# Patient Record
Sex: FEMALE | Race: WHITE | NOT HISPANIC OR LATINO | Employment: FULL TIME | URBAN - METROPOLITAN AREA
[De-identification: names, ages, dates, MRNs, and addresses within clinical notes are randomized per-mention and may not be internally consistent; named-entity substitution may affect disease eponyms.]

---

## 2023-10-23 ENCOUNTER — OFFICE VISIT (OUTPATIENT)
Dept: PRIMARY CARE | Facility: CLINIC | Age: 53
End: 2023-10-23
Payer: COMMERCIAL

## 2023-10-23 VITALS
HEIGHT: 63 IN | HEART RATE: 83 BPM | WEIGHT: 227 LBS | TEMPERATURE: 97.3 F | OXYGEN SATURATION: 98 % | DIASTOLIC BLOOD PRESSURE: 78 MMHG | BODY MASS INDEX: 40.22 KG/M2 | SYSTOLIC BLOOD PRESSURE: 124 MMHG

## 2023-10-23 DIAGNOSIS — M25.512 ACUTE PAIN OF LEFT SHOULDER: ICD-10-CM

## 2023-10-23 DIAGNOSIS — M54.2 NECK PAIN: Primary | ICD-10-CM

## 2023-10-23 DIAGNOSIS — R22.1 NODULE OF NECK: ICD-10-CM

## 2023-10-23 PROBLEM — E78.5 DYSLIPIDEMIA: Status: ACTIVE | Noted: 2023-10-23

## 2023-10-23 PROBLEM — R79.89 ABNORMAL LFTS: Status: ACTIVE | Noted: 2023-10-23

## 2023-10-23 PROCEDURE — 99213 OFFICE O/P EST LOW 20 MIN: CPT | Performed by: INTERNAL MEDICINE

## 2023-10-23 PROCEDURE — 1036F TOBACCO NON-USER: CPT | Performed by: INTERNAL MEDICINE

## 2023-10-23 RX ORDER — TIRZEPATIDE 10 MG/.5ML
INJECTION, SOLUTION SUBCUTANEOUS
COMMUNITY
Start: 2023-10-08

## 2023-10-23 RX ORDER — PHENTERMINE HYDROCHLORIDE 37.5 MG/1
37.5 TABLET ORAL
COMMUNITY
Start: 2023-07-21 | End: 2023-11-22 | Stop reason: ALTCHOICE

## 2023-10-23 RX ORDER — TIRZEPATIDE 2.5 MG/.5ML
INJECTION, SOLUTION SUBCUTANEOUS
COMMUNITY
Start: 2023-02-26

## 2023-10-23 ASSESSMENT — PATIENT HEALTH QUESTIONNAIRE - PHQ9
1. LITTLE INTEREST OR PLEASURE IN DOING THINGS: SEVERAL DAYS
2. FEELING DOWN, DEPRESSED OR HOPELESS: SEVERAL DAYS
SUM OF ALL RESPONSES TO PHQ9 QUESTIONS 1 AND 2: 2

## 2023-10-23 NOTE — PROGRESS NOTES
Subjective   Patient ID: Alysha Lou is a 52 y.o. female who presents for ER Follow-up (EP.  ER ([place]) follow up for heart attack symptoms but everything was normal.  R side of neck has muscular lump.).  HPI  Patient presents today for an ER follow-up.  She was in the ER 13 days ago on 10/10.  She was there due to pain in her left arm left neck and left shoulder and left upper chest.  She had a complete cardiac work-up.  She was felt to be noncardiac in nature.  All of test came back okay.  Patient states that that afternoon before she had went to the ER she had been chaperoning a field trip for school.  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  She states that she had been turned sideways and holding onto the bus seat while she was doing some counting.  The next day after the ER she realized that the motion that bothers her shoulder was the exact same motion of her head turned in her body turn holding onto the seat on the bus while counting students.  Patient states the symptoms have subsequently resolved.  She has no shortness of breath no breathing issues.  She is currently on Medrol and phentermine for weight loss.  She goes to a weight loss clinic for this.    Patient has no nausea vomiting diarrhea.  We reviewed all of her ER records they all were fine.  She was on that day seen by the school nurse as well as an urgent care that sent her to the ER.  Ultimately ER doctor released her for follow-up he told her it was noncardiac that she need to see her doctor.    Patient also complains of nodularity in her right neck that she can feel in [place] and feels round to linear in nature.  Patient has lost a considerable amount of weight.  She feels this tendon runs up and down her neck.  She does want me to check it.    Review of Systems  Review of systems was performed and is otherwise negative except as  "noted in HPI.  Objective   /78   Pulse 83   Temp 36.3 °C (97.3 °F) (Oral)   Ht 1.6 m (5' 3\")   Wt 103 kg (227 lb)   SpO2 98%   BMI 40.21 kg/m²    Physical Exam  HEENT is normal  Neck is supple she has a nonconcerning anterior reactive lymph node she has some palpable tendons in her neck that she states is the area of her concern  Lungs are clear bilaterally  Heart is regular rate and rhythm no murmurs  Abdomen is benign  Shoulders normal bilaterally with full range of motion no pain on palpation  Assessment/Plan   Diagnoses and all orders for this visit:  Neck pain  Acute pain of left shoulder  Nodule of neck    Patient will follow the nodularity of the neck  For shoulder and neck pain on the other side of her resolved on the left.  She will call if any symptoms recur  She will keep her routine follow-up appointment with me  Leela Landry MD   "

## 2023-11-14 ENCOUNTER — ANCILLARY PROCEDURE (OUTPATIENT)
Dept: RADIOLOGY | Facility: CLINIC | Age: 53
End: 2023-11-14
Payer: COMMERCIAL

## 2023-11-14 DIAGNOSIS — Z12.31 SCREENING MAMMOGRAM FOR BREAST CANCER: ICD-10-CM

## 2023-11-14 PROCEDURE — 77063 BREAST TOMOSYNTHESIS BI: CPT | Performed by: RADIOLOGY

## 2023-11-14 PROCEDURE — 77067 SCR MAMMO BI INCL CAD: CPT

## 2023-11-14 PROCEDURE — 77067 SCR MAMMO BI INCL CAD: CPT | Performed by: RADIOLOGY

## 2023-11-22 ENCOUNTER — OFFICE VISIT (OUTPATIENT)
Dept: PRIMARY CARE | Facility: CLINIC | Age: 53
End: 2023-11-22
Payer: COMMERCIAL

## 2023-11-22 VITALS
HEIGHT: 63 IN | DIASTOLIC BLOOD PRESSURE: 78 MMHG | WEIGHT: 230 LBS | SYSTOLIC BLOOD PRESSURE: 124 MMHG | TEMPERATURE: 98.2 F | HEART RATE: 71 BPM | OXYGEN SATURATION: 97 % | BODY MASS INDEX: 40.75 KG/M2

## 2023-11-22 DIAGNOSIS — F41.9 ANXIETY: Primary | ICD-10-CM

## 2023-11-22 PROCEDURE — 99214 OFFICE O/P EST MOD 30 MIN: CPT | Performed by: INTERNAL MEDICINE

## 2023-11-22 PROCEDURE — 1036F TOBACCO NON-USER: CPT | Performed by: INTERNAL MEDICINE

## 2023-11-22 RX ORDER — ESCITALOPRAM OXALATE 5 MG/1
5 TABLET ORAL DAILY
Qty: 30 TABLET | Refills: 1 | Status: SHIPPED | OUTPATIENT
Start: 2023-11-22 | End: 2023-12-28 | Stop reason: SDUPTHER

## 2023-11-22 RX ORDER — ALPRAZOLAM 0.5 MG/1
0.5 TABLET ORAL DAILY PRN
Qty: 7 TABLET | Refills: 0 | Status: SHIPPED | OUTPATIENT
Start: 2023-11-22 | End: 2024-05-10 | Stop reason: SDUPTHER

## 2023-11-22 ASSESSMENT — PATIENT HEALTH QUESTIONNAIRE - PHQ9
1. LITTLE INTEREST OR PLEASURE IN DOING THINGS: NOT AT ALL
2. FEELING DOWN, DEPRESSED OR HOPELESS: NOT AT ALL
SUM OF ALL RESPONSES TO PHQ9 QUESTIONS 1 AND 2: 0
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
## 2023-12-12 DIAGNOSIS — E78.5 DYSLIPIDEMIA: ICD-10-CM

## 2023-12-12 DIAGNOSIS — R79.89 ABNORMAL LFTS: ICD-10-CM

## 2023-12-12 PROBLEM — R53.83 FATIGUE: Status: ACTIVE | Noted: 2023-12-12

## 2023-12-28 ENCOUNTER — OFFICE VISIT (OUTPATIENT)
Dept: PRIMARY CARE | Facility: CLINIC | Age: 53
End: 2023-12-28
Payer: COMMERCIAL

## 2023-12-28 ENCOUNTER — LAB (OUTPATIENT)
Dept: LAB | Facility: LAB | Age: 53
End: 2023-12-28
Payer: COMMERCIAL

## 2023-12-28 VITALS
DIASTOLIC BLOOD PRESSURE: 76 MMHG | HEIGHT: 63 IN | WEIGHT: 225 LBS | BODY MASS INDEX: 39.87 KG/M2 | OXYGEN SATURATION: 95 % | HEART RATE: 88 BPM | SYSTOLIC BLOOD PRESSURE: 122 MMHG | TEMPERATURE: 97.6 F

## 2023-12-28 DIAGNOSIS — E78.5 DYSLIPIDEMIA: ICD-10-CM

## 2023-12-28 DIAGNOSIS — E78.5 DYSLIPIDEMIA: Primary | ICD-10-CM

## 2023-12-28 DIAGNOSIS — R79.89 ABNORMAL LFTS: ICD-10-CM

## 2023-12-28 DIAGNOSIS — F41.9 ANXIETY: ICD-10-CM

## 2023-12-28 LAB
ALBUMIN SERPL BCP-MCNC: 4.2 G/DL (ref 3.4–5)
ALP SERPL-CCNC: 96 U/L (ref 33–110)
ALT SERPL W P-5'-P-CCNC: 22 U/L (ref 7–45)
ANION GAP SERPL CALC-SCNC: 9 MMOL/L (ref 10–20)
AST SERPL W P-5'-P-CCNC: 20 U/L (ref 9–39)
BILIRUB SERPL-MCNC: 1 MG/DL (ref 0–1.2)
BUN SERPL-MCNC: 17 MG/DL (ref 6–23)
CALCIUM SERPL-MCNC: 10.1 MG/DL (ref 8.6–10.6)
CHLORIDE SERPL-SCNC: 108 MMOL/L (ref 98–107)
CHOLEST SERPL-MCNC: 182 MG/DL (ref 0–199)
CHOLESTEROL/HDL RATIO: 3.3
CO2 SERPL-SCNC: 29 MMOL/L (ref 21–32)
CREAT SERPL-MCNC: 0.91 MG/DL (ref 0.5–1.05)
GFR SERPL CREATININE-BSD FRML MDRD: 75 ML/MIN/1.73M*2
GLUCOSE SERPL-MCNC: 90 MG/DL (ref 74–99)
HDLC SERPL-MCNC: 56 MG/DL
LDLC SERPL CALC-MCNC: 104 MG/DL
NON HDL CHOLESTEROL: 126 MG/DL (ref 0–149)
POTASSIUM SERPL-SCNC: 4.3 MMOL/L (ref 3.5–5.3)
PROT SERPL-MCNC: 7.2 G/DL (ref 6.4–8.2)
SODIUM SERPL-SCNC: 142 MMOL/L (ref 136–145)
TRIGL SERPL-MCNC: 112 MG/DL (ref 0–149)
VLDL: 22 MG/DL (ref 0–40)

## 2023-12-28 PROCEDURE — 80053 COMPREHEN METABOLIC PANEL: CPT

## 2023-12-28 PROCEDURE — 36415 COLL VENOUS BLD VENIPUNCTURE: CPT

## 2023-12-28 PROCEDURE — 99213 OFFICE O/P EST LOW 20 MIN: CPT | Performed by: INTERNAL MEDICINE

## 2023-12-28 PROCEDURE — 80061 LIPID PANEL: CPT

## 2023-12-28 PROCEDURE — 1036F TOBACCO NON-USER: CPT | Performed by: INTERNAL MEDICINE

## 2023-12-28 RX ORDER — ESCITALOPRAM OXALATE 5 MG/1
5 TABLET ORAL DAILY
Qty: 90 TABLET | Refills: 1 | Status: SHIPPED | OUTPATIENT
Start: 2023-12-28 | End: 2024-05-10 | Stop reason: DRUGHIGH

## 2023-12-28 ASSESSMENT — PATIENT HEALTH QUESTIONNAIRE - PHQ9
SUM OF ALL RESPONSES TO PHQ9 QUESTIONS 1 AND 2: 0
1. LITTLE INTEREST OR PLEASURE IN DOING THINGS: NOT AT ALL
2. FEELING DOWN, DEPRESSED OR HOPELESS: NOT AT ALL

## 2023-12-28 NOTE — PROGRESS NOTES
"Subjective   Patient ID: Alysha Lou is a 53 y.o. female who presents for Follow-up (EP.  Follow up on Anxiety.  Doing good.).  HPI  Patient presents today for follow-up of anxiety.  She has been on Lexapro 5 mg.  She states it has been working very well and she is pleased with its effects.  She will to continue it.  She states her sleep and her appetite are good her moods are good she is not having any untoward side effects.    Patient states she is also been doing Mounjaro through the independent [facility] and now that southbound is out she is looking at changing over to that.  She has been successful with the Mounjaro and she would like to continue the GLP-1 therapy.    Patient had her blood work done today it was not done at the time of the appointment    Review of Systems  Review of systems was performed and is otherwise negative except as noted in HPI.  Objective   /76   Pulse 88   Temp 36.4 °C (97.6 °F) (Oral)   Ht 1.6 m (5' 3\")   Wt 102 kg (225 lb)   SpO2 95%   BMI 39.86 kg/m²    Physical Exam  HEENT is normal  Lungs clear bilaterally  Heart is regular rate rhythm no murmurs  Abdomen benign  Lower extremities no edema    Assessment/Plan   Diagnoses and all orders for this visit:  Dyslipidemia  Anxiety  -     escitalopram (Lexapro) 5 mg tablet; Take 1 tablet (5 mg) by mouth once daily.    Call with issues  Check blood work  Follow-up with me in 6 months  At that time she will be due for physical she will continue to work on diet and exercise  Leela Landry MD   "

## 2024-05-10 ENCOUNTER — OFFICE VISIT (OUTPATIENT)
Dept: PRIMARY CARE | Facility: CLINIC | Age: 54
End: 2024-05-10
Payer: COMMERCIAL

## 2024-05-10 VITALS
OXYGEN SATURATION: 99 % | WEIGHT: 225 LBS | SYSTOLIC BLOOD PRESSURE: 122 MMHG | HEIGHT: 63 IN | HEART RATE: 72 BPM | TEMPERATURE: 98 F | DIASTOLIC BLOOD PRESSURE: 76 MMHG | BODY MASS INDEX: 39.87 KG/M2

## 2024-05-10 DIAGNOSIS — F41.9 ANXIETY: ICD-10-CM

## 2024-05-10 PROCEDURE — 99214 OFFICE O/P EST MOD 30 MIN: CPT | Performed by: INTERNAL MEDICINE

## 2024-05-10 PROCEDURE — 1036F TOBACCO NON-USER: CPT | Performed by: INTERNAL MEDICINE

## 2024-05-10 RX ORDER — ALPRAZOLAM 0.5 MG/1
0.5 TABLET ORAL DAILY PRN
Qty: 7 TABLET | Refills: 0 | Status: SHIPPED | OUTPATIENT
Start: 2024-05-10 | End: 2025-01-05

## 2024-05-10 RX ORDER — ESCITALOPRAM OXALATE 10 MG/1
10 TABLET ORAL DAILY
Qty: 90 TABLET | Refills: 0 | Status: SHIPPED | OUTPATIENT
Start: 2024-05-10 | End: 2024-11-06

## 2024-05-10 RX ORDER — ESCITALOPRAM OXALATE 5 MG/1
5 TABLET ORAL DAILY
Qty: 90 TABLET | Refills: 1 | Status: CANCELLED | OUTPATIENT
Start: 2024-05-10 | End: 2024-11-06

## 2024-05-10 ASSESSMENT — PATIENT HEALTH QUESTIONNAIRE - PHQ9
2. FEELING DOWN, DEPRESSED OR HOPELESS: NOT AT ALL
SUM OF ALL RESPONSES TO PHQ9 QUESTIONS 1 AND 2: 0
1. LITTLE INTEREST OR PLEASURE IN DOING THINGS: NOT AT ALL
SUM OF ALL RESPONSES TO PHQ9 QUESTIONS 1 AND 2: 0
1. LITTLE INTEREST OR PLEASURE IN DOING THINGS: NOT AT ALL
2. FEELING DOWN, DEPRESSED OR HOPELESS: NOT AT ALL

## 2024-05-10 ASSESSMENT — ENCOUNTER SYMPTOMS
LOSS OF SENSATION IN FEET: 0
DEPRESSION: 0
OCCASIONAL FEELINGS OF UNSTEADINESS: 0

## 2024-05-10 NOTE — LETTER
May 10, 2024     Patient: Alysha Lou   YOB: 1970   Date of Visit: 5/10/2024       To Whom It May Concern:    Alysha Lou was seen in my clinic on 5/10/2024 at 10:45 am. Please excuse Alysha for her absence from work on this day to make the appointment.  Please excuse 5/10/24 through 6/6/24 due to  medical condition.    If you have any questions or concerns, please don't hesitate to call.         Sincerely,         Leela Landry MD        CC: No Recipients
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
## 2024-05-14 ENCOUNTER — TELEPHONE (OUTPATIENT)
Dept: PRIMARY CARE | Facility: CLINIC | Age: 54
End: 2024-05-14
Payer: COMMERCIAL

## 2024-08-09 ENCOUNTER — APPOINTMENT (OUTPATIENT)
Dept: PRIMARY CARE | Facility: CLINIC | Age: 54
End: 2024-08-09
Payer: COMMERCIAL

## 2024-10-09 ENCOUNTER — TELEPHONE (OUTPATIENT)
Dept: PRIMARY CARE | Facility: CLINIC | Age: 54
End: 2024-10-09
Payer: COMMERCIAL

## 2024-10-09 DIAGNOSIS — R53.83 FATIGUE, UNSPECIFIED TYPE: ICD-10-CM

## 2024-10-09 DIAGNOSIS — Z12.31 VISIT FOR SCREENING MAMMOGRAM: ICD-10-CM

## 2024-10-09 DIAGNOSIS — E78.5 DYSLIPIDEMIA: ICD-10-CM

## 2024-10-09 DIAGNOSIS — R79.89 ABNORMAL LFTS: ICD-10-CM

## 2024-10-09 NOTE — TELEPHONE ENCOUNTER
Pt called needing to schedule a CPE.  She needs a Bio Metric Screening completed by 11/25/24.  She was scheduled for a follow up anxiety and will schedule her CPE at a later date.     She has an appt on 11/19/24 and needs b/w order prior to her visit.

## 2024-11-16 ENCOUNTER — LAB (OUTPATIENT)
Dept: LAB | Facility: LAB | Age: 54
End: 2024-11-16
Payer: COMMERCIAL

## 2024-11-16 DIAGNOSIS — R53.83 FATIGUE, UNSPECIFIED TYPE: ICD-10-CM

## 2024-11-16 DIAGNOSIS — E78.5 DYSLIPIDEMIA: ICD-10-CM

## 2024-11-16 DIAGNOSIS — R79.89 ABNORMAL LFTS: ICD-10-CM

## 2024-11-16 LAB
ALBUMIN SERPL BCP-MCNC: 4.4 G/DL (ref 3.4–5)
ALP SERPL-CCNC: 72 U/L (ref 33–110)
ALT SERPL W P-5'-P-CCNC: 18 U/L (ref 7–45)
ANION GAP SERPL CALC-SCNC: 10 MMOL/L (ref 10–20)
AST SERPL W P-5'-P-CCNC: 19 U/L (ref 9–39)
BASOPHILS # BLD AUTO: 0.05 X10*3/UL (ref 0–0.1)
BASOPHILS NFR BLD AUTO: 0.7 %
BILIRUB SERPL-MCNC: 1.3 MG/DL (ref 0–1.2)
BUN SERPL-MCNC: 26 MG/DL (ref 6–23)
CALCIUM SERPL-MCNC: 10 MG/DL (ref 8.6–10.6)
CHLORIDE SERPL-SCNC: 107 MMOL/L (ref 98–107)
CHOLEST SERPL-MCNC: 168 MG/DL (ref 0–199)
CHOLESTEROL/HDL RATIO: 3.6
CO2 SERPL-SCNC: 25 MMOL/L (ref 21–32)
CREAT SERPL-MCNC: 0.75 MG/DL (ref 0.5–1.05)
EGFRCR SERPLBLD CKD-EPI 2021: >90 ML/MIN/1.73M*2
EOSINOPHIL # BLD AUTO: 0.15 X10*3/UL (ref 0–0.7)
EOSINOPHIL NFR BLD AUTO: 2 %
ERYTHROCYTE [DISTWIDTH] IN BLOOD BY AUTOMATED COUNT: 12.8 % (ref 11.5–14.5)
GLUCOSE SERPL-MCNC: 87 MG/DL (ref 74–99)
HCT VFR BLD AUTO: 42.3 % (ref 36–46)
HDLC SERPL-MCNC: 47.1 MG/DL
HGB BLD-MCNC: 13.7 G/DL (ref 12–16)
IMM GRANULOCYTES # BLD AUTO: 0.02 X10*3/UL (ref 0–0.7)
IMM GRANULOCYTES NFR BLD AUTO: 0.3 % (ref 0–0.9)
LDLC SERPL CALC-MCNC: 103 MG/DL
LYMPHOCYTES # BLD AUTO: 2.41 X10*3/UL (ref 1.2–4.8)
LYMPHOCYTES NFR BLD AUTO: 32.9 %
MCH RBC QN AUTO: 28.7 PG (ref 26–34)
MCHC RBC AUTO-ENTMCNC: 32.4 G/DL (ref 32–36)
MCV RBC AUTO: 89 FL (ref 80–100)
MONOCYTES # BLD AUTO: 0.54 X10*3/UL (ref 0.1–1)
MONOCYTES NFR BLD AUTO: 7.4 %
NEUTROPHILS # BLD AUTO: 4.16 X10*3/UL (ref 1.2–7.7)
NEUTROPHILS NFR BLD AUTO: 56.7 %
NON HDL CHOLESTEROL: 121 MG/DL (ref 0–149)
NRBC BLD-RTO: 0 /100 WBCS (ref 0–0)
PLATELET # BLD AUTO: 273 X10*3/UL (ref 150–450)
POTASSIUM SERPL-SCNC: 4.3 MMOL/L (ref 3.5–5.3)
PROT SERPL-MCNC: 7.5 G/DL (ref 6.4–8.2)
RBC # BLD AUTO: 4.78 X10*6/UL (ref 4–5.2)
SODIUM SERPL-SCNC: 138 MMOL/L (ref 136–145)
TRIGL SERPL-MCNC: 89 MG/DL (ref 0–149)
TSH SERPL-ACNC: 1.01 MIU/L (ref 0.44–3.98)
VLDL: 18 MG/DL (ref 0–40)
WBC # BLD AUTO: 7.3 X10*3/UL (ref 4.4–11.3)

## 2024-11-16 PROCEDURE — 80053 COMPREHEN METABOLIC PANEL: CPT

## 2024-11-16 PROCEDURE — 84443 ASSAY THYROID STIM HORMONE: CPT

## 2024-11-16 PROCEDURE — 80061 LIPID PANEL: CPT

## 2024-11-16 PROCEDURE — 85025 COMPLETE CBC W/AUTO DIFF WBC: CPT

## 2024-11-16 PROCEDURE — 36415 COLL VENOUS BLD VENIPUNCTURE: CPT

## 2024-11-19 ENCOUNTER — APPOINTMENT (OUTPATIENT)
Dept: PRIMARY CARE | Facility: CLINIC | Age: 54
End: 2024-11-19
Payer: COMMERCIAL

## 2024-11-19 VITALS
HEIGHT: 63 IN | WEIGHT: 221 LBS | BODY MASS INDEX: 39.16 KG/M2 | OXYGEN SATURATION: 97 % | DIASTOLIC BLOOD PRESSURE: 72 MMHG | TEMPERATURE: 98.1 F | HEART RATE: 76 BPM | SYSTOLIC BLOOD PRESSURE: 118 MMHG

## 2024-11-19 DIAGNOSIS — Z00.00 ROUTINE GENERAL MEDICAL EXAMINATION AT A HEALTH CARE FACILITY: Primary | ICD-10-CM

## 2024-11-19 DIAGNOSIS — F41.9 ANXIETY: ICD-10-CM

## 2024-11-19 PROCEDURE — 99214 OFFICE O/P EST MOD 30 MIN: CPT | Performed by: INTERNAL MEDICINE

## 2024-11-19 PROCEDURE — 1036F TOBACCO NON-USER: CPT | Performed by: INTERNAL MEDICINE

## 2024-11-19 PROCEDURE — 3008F BODY MASS INDEX DOCD: CPT | Performed by: INTERNAL MEDICINE

## 2024-11-19 RX ORDER — ESCITALOPRAM OXALATE 10 MG/1
10 TABLET ORAL DAILY
Qty: 90 TABLET | Refills: 0 | Status: SHIPPED | OUTPATIENT
Start: 2024-11-19 | End: 2025-05-18

## 2024-11-19 RX ORDER — MEDROXYPROGESTERONE ACETATE 2.5 MG/1
2.5 TABLET ORAL DAILY
COMMUNITY
Start: 2024-10-29

## 2024-11-19 RX ORDER — ESTRADIOL 1 MG/1
1 TABLET ORAL ONCE
COMMUNITY
Start: 2024-10-02

## 2024-11-19 ASSESSMENT — ENCOUNTER SYMPTOMS
DEPRESSION: 0
LOSS OF SENSATION IN FEET: 0
OCCASIONAL FEELINGS OF UNSTEADINESS: 0

## 2024-11-19 NOTE — PROGRESS NOTES
"Subjective   Patient ID: Alysha Lou is a 53 y.o. female who presents for Follow-up (EP.  Follow up anxiety.  Labs done.  Concerned has ADHD.  Started hormones.).  HPI  Here for  follow  up    Has been in a weight management program and they dont want her to do a HIIT program because it raises cortisol    On  tirezepatide and plateaued and   has chged  dose and calories and still stuck  Her weight is plateaued over the last 4 to 5 months but she felt it was due to elevated cortisol with her previous job however she has changed jobs and she still having issues  Concerned about focus and thinks might have ADD and has executive function issues she would like to have this evaluated   New to hormones from gynecologist  has hot flashes and using for this vasomotor instability  She denies chest pains headaches dizziness lightheadedness or shortness of breath she has no lower extremity edema    Review of Systems  Review of systems was performed and is otherwise negative except as noted in HPI.      Objective   /72   Pulse 76   Temp 36.7 °C (98.1 °F) (Oral)   Ht 1.6 m (5' 3\")   Wt 100 kg (221 lb)   SpO2 97%   BMI 39.15 kg/m²      Physical Exam  HEENT is normal  Lungs clear bilaterally  Heart is regular rate rhythm no murmurs  Abdomen benign  Lower extremities no edema     Assessment/Plan   Diagnoses and all orders for this visit:  Routine general medical examination at a health care facility  -     CBC and Auto Differential; Future  -     Comprehensive Metabolic Panel; Future  -     Lipid Panel; Future  -     TSH with reflex to Free T4 if abnormal; Future  Anxiety  -     escitalopram (Lexapro) 10 mg tablet; Take 1 tablet (10 mg) by mouth once daily.    Concerns regarding ADD-will refer to psychology for evaluation  Follow-up with gynecology  She will continue to follow-up with weight management  Refill Lexapro but she may try 5 mg due to family stresses without appearance that are in [place]  Blood work " ordered for 6 months follow-up with me for physical    Leela Landry MD

## 2025-05-11 LAB
ALBUMIN SERPL-MCNC: 4.4 G/DL (ref 3.6–5.1)
ALP SERPL-CCNC: 69 U/L (ref 37–153)
ALT SERPL-CCNC: 15 U/L (ref 6–29)
ANION GAP SERPL CALCULATED.4IONS-SCNC: 4 MMOL/L (CALC) (ref 7–17)
AST SERPL-CCNC: 17 U/L (ref 10–35)
BASOPHILS # BLD AUTO: 30 CELLS/UL (ref 0–200)
BASOPHILS NFR BLD AUTO: 0.5 %
BILIRUB SERPL-MCNC: 1.1 MG/DL (ref 0.2–1.2)
BUN SERPL-MCNC: 21 MG/DL (ref 7–25)
CALCIUM SERPL-MCNC: 9.5 MG/DL (ref 8.6–10.4)
CHLORIDE SERPL-SCNC: 108 MMOL/L (ref 98–110)
CHOLEST SERPL-MCNC: 167 MG/DL
CHOLEST/HDLC SERPL: 3.3 (CALC)
CO2 SERPL-SCNC: 28 MMOL/L (ref 20–32)
CREAT SERPL-MCNC: 0.82 MG/DL (ref 0.5–1.03)
EGFRCR SERPLBLD CKD-EPI 2021: 84 ML/MIN/1.73M2
EOSINOPHIL # BLD AUTO: 120 CELLS/UL (ref 15–500)
EOSINOPHIL NFR BLD AUTO: 2 %
ERYTHROCYTE [DISTWIDTH] IN BLOOD BY AUTOMATED COUNT: 13.2 % (ref 11–15)
GLUCOSE SERPL-MCNC: 85 MG/DL (ref 65–99)
HCT VFR BLD AUTO: 42.5 % (ref 35–45)
HCV AB SERPL QL IA: NORMAL
HDLC SERPL-MCNC: 50 MG/DL
HGB BLD-MCNC: 13.9 G/DL (ref 11.7–15.5)
LDLC SERPL CALC-MCNC: 100 MG/DL (CALC)
LYMPHOCYTES # BLD AUTO: 2016 CELLS/UL (ref 850–3900)
LYMPHOCYTES NFR BLD AUTO: 33.6 %
MCH RBC QN AUTO: 30.2 PG (ref 27–33)
MCHC RBC AUTO-ENTMCNC: 32.7 G/DL (ref 32–36)
MCV RBC AUTO: 92.4 FL (ref 80–100)
MONOCYTES # BLD AUTO: 306 CELLS/UL (ref 200–950)
MONOCYTES NFR BLD AUTO: 5.1 %
NEUTROPHILS # BLD AUTO: 3528 CELLS/UL (ref 1500–7800)
NEUTROPHILS NFR BLD AUTO: 58.8 %
NONHDLC SERPL-MCNC: 117 MG/DL (CALC)
PLATELET # BLD AUTO: 236 THOUSAND/UL (ref 140–400)
PMV BLD REES-ECKER: 9.6 FL (ref 7.5–12.5)
POTASSIUM SERPL-SCNC: 4.5 MMOL/L (ref 3.5–5.3)
PROT SERPL-MCNC: 7.2 G/DL (ref 6.1–8.1)
RBC # BLD AUTO: 4.6 MILLION/UL (ref 3.8–5.1)
SODIUM SERPL-SCNC: 140 MMOL/L (ref 135–146)
TRIGL SERPL-MCNC: 77 MG/DL
TSH SERPL-ACNC: 1.08 MIU/L (ref 0.4–4.5)
WBC # BLD AUTO: 6 THOUSAND/UL (ref 3.8–10.8)

## 2025-05-12 ENCOUNTER — APPOINTMENT (OUTPATIENT)
Dept: PRIMARY CARE | Facility: CLINIC | Age: 55
End: 2025-05-12
Payer: COMMERCIAL

## 2025-05-12 VITALS
DIASTOLIC BLOOD PRESSURE: 76 MMHG | SYSTOLIC BLOOD PRESSURE: 114 MMHG | OXYGEN SATURATION: 98 % | TEMPERATURE: 98.1 F | HEIGHT: 63 IN | WEIGHT: 217 LBS | HEART RATE: 74 BPM | BODY MASS INDEX: 38.45 KG/M2

## 2025-05-12 DIAGNOSIS — E66.09 CLASS 2 OBESITY DUE TO EXCESS CALORIES WITHOUT SERIOUS COMORBIDITY WITH BODY MASS INDEX (BMI) OF 38.0 TO 38.9 IN ADULT: Primary | ICD-10-CM

## 2025-05-12 DIAGNOSIS — F41.9 ANXIETY: ICD-10-CM

## 2025-05-12 DIAGNOSIS — Z12.31 ENCOUNTER FOR SCREENING MAMMOGRAM FOR MALIGNANT NEOPLASM OF BREAST: ICD-10-CM

## 2025-05-12 DIAGNOSIS — E66.812 CLASS 2 OBESITY DUE TO EXCESS CALORIES WITHOUT SERIOUS COMORBIDITY WITH BODY MASS INDEX (BMI) OF 38.0 TO 38.9 IN ADULT: Primary | ICD-10-CM

## 2025-05-12 DIAGNOSIS — Z00.00 ROUTINE GENERAL MEDICAL EXAMINATION AT A HEALTH CARE FACILITY: ICD-10-CM

## 2025-05-12 PROCEDURE — 3008F BODY MASS INDEX DOCD: CPT | Performed by: INTERNAL MEDICINE

## 2025-05-12 PROCEDURE — 99396 PREV VISIT EST AGE 40-64: CPT | Performed by: INTERNAL MEDICINE

## 2025-05-12 PROCEDURE — 1036F TOBACCO NON-USER: CPT | Performed by: INTERNAL MEDICINE

## 2025-05-12 RX ORDER — ESCITALOPRAM OXALATE 10 MG/1
10 TABLET ORAL DAILY
Qty: 90 TABLET | Refills: 1 | Status: SHIPPED | OUTPATIENT
Start: 2025-05-12 | End: 2025-11-08

## 2025-05-12 RX ORDER — ALPRAZOLAM 0.5 MG/1
0.5 TABLET ORAL DAILY PRN
Qty: 7 TABLET | Refills: 0 | Status: CANCELLED | OUTPATIENT
Start: 2025-05-12 | End: 2026-01-07

## 2025-05-12 ASSESSMENT — ENCOUNTER SYMPTOMS
LOSS OF SENSATION IN FEET: 0
OCCASIONAL FEELINGS OF UNSTEADINESS: 0
DEPRESSION: 0

## 2025-05-12 NOTE — PROGRESS NOTES
Subjective   Patient ID: Alysha Lou is a 54 y.o. female who presents for Annual Exam (EP.  Annual exam.  Labs done.  R ear pain and fluid .  Migraines on R side of head.).    HPI  Patient is here for annual check-up      History of Present Illness  Alysha Lou is a 54 year old female who presents for a follow-up visit regarding her antidepressant and anxiety medication management.    She is currently taking Lexapro at a dose of 10 mg, which has been effective for her. She has been taking this dose consistently over the past week and wishes to continue at this level. She also occasionally uses alprazolam (Xanax) but does not require a refill at this time.    She is on hormone therapy prescribed by her gynecologist and continues to use Mounjaro, although she has had to switch to a compounded version due to insurance issues. The compounded version costs $329, which is higher than her previous cost of $199. She has experienced a weight loss of 10 pounds over the last four to five months but has not lost any weight in the past month.    She experiences headaches, which she describes as muscle strain and sometimes associates with fluid in her ear, leading to dizziness. She has not used nasal steroid sprays like Flonase. She notes that her symptoms may be related to weather changes or allergies.    She drinks one to two cups of caffeine daily, consumes beer, and does not smoke. 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 No swelling in the legs unless sitting for extended periods. Occasional dizziness, attributed to not eating properly.       Last well check 1 yr     Reported health good    Dental  check  reg     Vision check reg   Vision issues n  Hearing issues n  Vaccines UTD  y    Diet healthy  on semaglutide   Exercise  regular   Caffeine 1 c   Alcohol rare  Tobacco never     Colon cancer screening  2022    Sees DR Munroe for Gyn      Review of Systems  GENERAL - Denies  "fever, fatigue or chills  SKIN - Denies rash, new skin lesions, or change in moles  EYES - Denies blurred vision, or change in visual acuity  EARS -pressure in the right ear sometimes on the right side of the face has a history of a lazy eye on the right so no she gets eyestrain   NOSE - Denies nasal congestion, discharge, or bleeding  MOUTH - Denies sore throat, postnasal drip or painful/difficulty swallowing  NECK - Denies pain or swelling  RESPIRATORY - Denies shortness of breath, cough, wheezing  CARDIOVASCULAR - Denies palpitations, chest pain, orthopnea, peripheral edema, syncope or claudication  GASTROINTESTINAL - Denies nausea, vomiting, diarrhea, constipation, abdominal pain, melena and or bright red blood  GENITOURINARY - Denies dysuria, frequency of urination, urgency, or hesitancy  MUSCULOSKELETAL - Denies joint or muscle pain, or back pain  NEUROLOGICAL -headaches on the right side see HEENT   PSYCHIATRIC - Denies depression, anxiety, substance abuse, suicidal or homicidal ideation  ENDOCRINE - Denies heat or cold intolerance, weight loss or gain, increasing thirst  HEMATO-IMMUNOLOGIC - Denies easy bruising, bleeding, oral ulcerations or recurrent infections    Objective   /76   Pulse 74   Temp 36.7 °C (98.1 °F) (Oral)   Ht 1.6 m (5' 3\")   Wt 98.4 kg (217 lb)   SpO2 98%   BMI 38.44 kg/m²      Physical Exam  CONSTITUTIONAL - well nourished, well developed, looks like stated age, in no acute distress, not ill-appearing, and not tired appearing  SKIN - normal skin color and pigmentation, normal skin turgor without rash, lesions, or nodules visualized  HEAD - no trauma, normocephalic  EYES - pupils are equal and reactive to light, extraocular muscles are intact, and normal external exam  ENT - TM's intact, no injection, no signs of infection, uvula midline, normal tongue movement and throat normal, no exudate, nasal passage without discharge and patent  NECK - supple without rigidity, no neck " mass was observed, no thyromegaly or thyroid nodules  CHEST - clear to auscultation, no wheezing, no crackles and no rales, good effort  CARDIAC - regular rate and regular rhythm, no skipped beats, no murmur  ABDOMEN - no organomegaly, soft, nontender, nondistended, normal bowel sounds, no guarding/rebound/rigidity, negative McBurney sign and negative Gutierrez sign  EXTREMITIES - no edema, no deformities  NEUROLOGICAL - normal gait, normal balance, normal motor, no ataxia, DTRs equal and symmetrical; alert, oriented and no focal signs  PSYCHIATRIC - alert, pleasant and cordial, age-appropriate  IMMUNOLOGIC - no cervical lymphadenopathy    Assessment/Plan   Diagnoses and all orders for this visit:  Routine general medical examination at a health care facility  Encounter for screening mammogram for malignant neoplasm of breast  -     BI mammo bilateral screening tomosynthesis; Future  Anxiety        Assessment & Plan  Anxiety  Anxiety is well-managed with escitalopram 10 mg daily. She is satisfied with the current dose and does not require alprazolam at this time.  - Continue escitalopram 10 mg oral daily with a consistent dosing schedule.  She may decrease temporarily to 5 mg    Depression  Depression is under control with current treatment. She is interested in attending therapy sessions monthly to support mental health.  - Encourage monthly therapy sessions.    Allergic rhinitis  Headaches and fluid in the ear may be related to sinus issues or allergies, potentially exacerbated by weather changes.  - Recommend nasal steroid spray, such as Flonase, as needed for sinus symptoms.  - Consider referral to an ophthalmologist if symptoms persist.    General Health Maintenance  Routine health maintenance is up to date. Hepatitis C test is negative, CBC and chemistry panel are normal, cholesterol and thyroid levels are within normal limits. She is due for a mammogram in October.  - Schedule mammogram for October.  - Ensure  follow-up with gynecologist for routine care.    Weight Management  Referral to endocrinology for weight management discussed, with potential wait time of a few months. Alternative weight management options, including Zepbound, were discussed with cost considerations.  - Referral to endocrinology for weight management.  - Consider Zepbound as an alternative for weight management, with cost ranging from $349 to $499 depending on dosage.  Follow-up in 6 months regarding Wilmer Landry MD    This medical note was created with the assistance of artificial intelligence (AI) for documentation purposes. The content has been reviewed and confirmed by the healthcare provider for accuracy and completeness. Patient consented to the use of audio recording and use of AI during their visit.

## 2025-05-19 ENCOUNTER — APPOINTMENT (OUTPATIENT)
Dept: ENDOCRINOLOGY | Facility: CLINIC | Age: 55
End: 2025-05-19
Payer: COMMERCIAL

## 2025-05-19 VITALS
TEMPERATURE: 97.7 F | BODY MASS INDEX: 38.66 KG/M2 | WEIGHT: 218.2 LBS | DIASTOLIC BLOOD PRESSURE: 84 MMHG | HEART RATE: 67 BPM | SYSTOLIC BLOOD PRESSURE: 129 MMHG | HEIGHT: 63 IN

## 2025-05-19 DIAGNOSIS — E66.09 CLASS 2 OBESITY DUE TO EXCESS CALORIES WITHOUT SERIOUS COMORBIDITY WITH BODY MASS INDEX (BMI) OF 38.0 TO 38.9 IN ADULT: Primary | ICD-10-CM

## 2025-05-19 DIAGNOSIS — Z00.00 HEALTH MAINTENANCE EXAMINATION: ICD-10-CM

## 2025-05-19 DIAGNOSIS — E66.812 CLASS 2 OBESITY DUE TO EXCESS CALORIES WITHOUT SERIOUS COMORBIDITY WITH BODY MASS INDEX (BMI) OF 38.0 TO 38.9 IN ADULT: Primary | ICD-10-CM

## 2025-05-19 PROBLEM — Z86.32 HISTORY OF GESTATIONAL DIABETES MELLITUS (GDM): Status: ACTIVE | Noted: 2025-05-19

## 2025-05-19 PROBLEM — E66.01 SEVERE OBESITY (MULTI): Status: ACTIVE | Noted: 2025-05-19

## 2025-05-19 PROBLEM — H25.13 NUCLEAR SENILE CATARACT OF BOTH EYES: Status: ACTIVE | Noted: 2020-07-09

## 2025-05-19 PROBLEM — H69.93 DYSFUNCTION OF BOTH EUSTACHIAN TUBES: Status: ACTIVE | Noted: 2025-05-19

## 2025-05-19 PROCEDURE — 3008F BODY MASS INDEX DOCD: CPT | Performed by: NURSE PRACTITIONER

## 2025-05-19 PROCEDURE — 1036F TOBACCO NON-USER: CPT | Performed by: NURSE PRACTITIONER

## 2025-05-19 PROCEDURE — 99204 OFFICE O/P NEW MOD 45 MIN: CPT | Performed by: NURSE PRACTITIONER

## 2025-05-19 ASSESSMENT — PATIENT HEALTH QUESTIONNAIRE - PHQ9
SUM OF ALL RESPONSES TO PHQ9 QUESTIONS 1 AND 2: 0
2. FEELING DOWN, DEPRESSED OR HOPELESS: NOT AT ALL
1. LITTLE INTEREST OR PLEASURE IN DOING THINGS: NOT AT ALL

## 2025-05-19 ASSESSMENT — ENCOUNTER SYMPTOMS
DEPRESSION: 0
OCCASIONAL FEELINGS OF UNSTEADINESS: 0
LOSS OF SENSATION IN FEET: 0

## 2025-05-19 NOTE — PATIENT INSTRUCTIONS
Nutrition: Have consult with the dietitians. Try to aim at 100 grams of protein per day. Continue with high fiber. Have 84 ounces at least of water per day. Try Miralax for the constipation  Exercise: Focus on building lean muscle- strength (yoga, body sculpt, Pilates, hiking, biking) and resistance bands  Follow up: Please have the dietitian consult and then the have the group visit

## 2025-05-19 NOTE — PROGRESS NOTES
"Alysha Lou presents for weight loss management and obesity consult today.  Referred by here PCP after discussing weight management over the last 2 years, PCP tried to prescribe a GLP1 but it was denied, she was then doing the [facility] program and did  Zepbound for a year and lost 55 pounds, she then went to [facility] GLP1. The Zepbound dose was increased and she hit a plateau at 213 since December of 2024. She is taking the Zepbound 15 mg weekly.  She is nervous about continuing the vials, she would like to decrease to 200.    Biggest challenge with weight management:   Currently having a plateau  Goal: Less than 200 pounds    History of Weight Loss Efforts: yes  Successful weight loss techniques attempted: [facility] and RD loss 55 pouds  Unsuccessful weight loss techniques attempted: nothing mentioned    Current typical daily diet:  Currently RD advised 1600 calories with high protein of 160 per day  No ETOH, no juice, no coffee  Take out/carry out/fast food weekly: none  Portion sizes/seconds with meals: small due to Zepbound    Snacking  Daytime snacks: 1 times a day- yogurt  Evening snacks: none      Describe Appetite (always hungry/no hunger/average):   Are you full after a meal?  yes  Food Noise: no, due to Zepbound  Emotional eater? No   Boredom eater? No     Current Exercise Habits   \"I was doing yoga and treadmill and rebounder\"  She is doing yoga 1 time in th week  Currently doing a lot of gardening and 10K steps     Sleep patterns (insomnia, waking in night) /hours of sleep per night: 7 hours  H/O Sleep apnea: no  Well rested? No     Increased Stressors (describe daily stress): no      Any intake of an appetite suppressant or an anti-obesity medicine? Yes     H/O Pancreatitis: no  H/O Thyroid Medullary Cancer: no    Medical History[1]    Current Medications[2]        Review of Systems  Review of Systems        Objective   There were no vitals taken for this visit.    Physical Exam  Physical " Exam    Lab Review  Lab Results   Component Value Date    HGBA1C 5.1 11/19/2022     Lab Results   Component Value Date    LDLCALC 100 (H) 05/10/2025       Weight Trends  Trends of weight reviewed in visit, see graph below:  Wt Readings from Last 3 Encounters:   05/12/25 98.4 kg (217 lb)   11/19/24 100 kg (221 lb)   05/10/24 102 kg (225 lb)   (  Assessment/Plan     Follow up and Goals:  ....      Visit length of 45 minutes      Problem List Items Addressed This Visit    None      Diet interventions: referral to dietitian for guidance in these changes  Discussed Mediterranean Diet, given pamphlet or it will be mailed, we looked at ADA plate, portion sizes, recipes. Advised to review in preparation for nutrition consult    Handouts given: yes    Exercise intervention:   We discussed the importance of incorporating resistance and free weight  lifting into physical activity routine to prevent muscle wasting with weight loss, enhance bone health, the positive role increased muscle has in burning fat at rest. We looked at examples of these types of exercise routines online. Advised to do modifications. Advised to check with PCP if there is a h/o musculoskeletal injury or hx. We discussed benefits of walking with weight loss and as a cardio form of exercise. Gradually increase exercise to a goal of 150 minutes at least per week.        Follow Up:  Referred to nutrition or continue to work with current dietitian   Discussed obesity medications, side effects and mechanism of action reviewed with patient  Discussed physical activity: we reviewed [employer] videos for strength training, advised to use modifications for these videos, we discussed benefits of strength training and resistance bands  on bone and muscle health, we discussed walking and water aerobic options for cardio  Discussed Mediterranean Diet, given pamphlet or it will be mailed, we looked at ADA plate, portion sizes, recipes. Advised to review Mediterranean Meal  Plan booklet  in preparation for nutrition consult  ....  1 month group visit and nutrition visit in person or  virtual  Please reach out if you need anything or have further questions         [1]   Past Medical History:  Diagnosis Date    Abnormal finding of blood chemistry, unspecified 12/07/2012    Abnormal blood chemistry    Acute frontal sinusitis, unspecified 02/11/2013    Acute frontal sinusitis    Acute sinusitis, unspecified     Acute sinusitis    Encounter for gynecological examination (general) (routine) without abnormal findings     Encounter for gynecological examination without abnormal finding    Encounter for screening mammogram for malignant neoplasm of breast     Visit for screening mammogram    Pelvic and perineal pain 11/08/2013    Pelvic pain    Personal history of diseases of the blood and blood-forming organs and certain disorders involving the immune mechanism 12/07/2012    History of iron deficiency anemia    Personal history of other diseases of the circulatory system 12/06/2013    Personal history of cardiac murmur    Personal history of other diseases of the nervous system and sense organs     History of earache    Unspecified injury of unspecified ankle, initial encounter 12/07/2012    Ankle injury    Vitamin D deficiency, unspecified 12/11/2013    Vitamin D deficiency   [2]   Current Outpatient Medications   Medication Sig Dispense Refill    ALPRAZolam (Xanax) 0.5 mg tablet Take 1 tablet (0.5 mg) by mouth once daily as needed for anxiety. 7 tablet 0    escitalopram (Lexapro) 10 mg tablet Take 1 tablet (10 mg) by mouth once daily. 90 tablet 1    estradiol (Estrace) 1 mg tablet Take 1 tablet (1 mg) by mouth 1 time.      medroxyPROGESTERone (Provera) 2.5 mg tablet Take 1 tablet (2.5 mg) by mouth once daily. Take with food.      Mounjaro 10 mg/0.5 mL pen injector        No current facility-administered medications for this visit.

## 2025-06-03 ENCOUNTER — APPOINTMENT (OUTPATIENT)
Dept: ENDOCRINOLOGY | Facility: CLINIC | Age: 55
End: 2025-06-03
Payer: COMMERCIAL

## 2025-06-03 VITALS — BODY MASS INDEX: 37.56 KG/M2 | WEIGHT: 212 LBS | HEIGHT: 63 IN

## 2025-06-03 DIAGNOSIS — Z71.3 DIETARY COUNSELING: Primary | ICD-10-CM

## 2025-06-03 DIAGNOSIS — E66.09 CLASS 2 OBESITY DUE TO EXCESS CALORIES WITHOUT SERIOUS COMORBIDITY WITH BODY MASS INDEX (BMI) OF 38.0 TO 38.9 IN ADULT: ICD-10-CM

## 2025-06-03 DIAGNOSIS — E66.812 CLASS 2 OBESITY DUE TO EXCESS CALORIES WITHOUT SERIOUS COMORBIDITY WITH BODY MASS INDEX (BMI) OF 38.0 TO 38.9 IN ADULT: ICD-10-CM

## 2025-06-03 NOTE — PATIENT INSTRUCTIONS
- Please refer to your book entitled: Your Mediterranean Meal Plan, and follow Mediterranean Diet eating guidelines as reviewed.  - The Healthy Plate style of eating can be a helpful tool for incorporating healthy balanced meals in appropriate portions. (Healthy Plate: Start with a 9-inch diameter plate. Fill 1/2 the plate with non-starchy vegetables, 1/4 of the plate with whole grains or starchy vegetables, and 1/4 of the plate with a lean source of protein.   - Please aim for a source of healthy protein and fiber rich foods at meals as discussed for nutrition needs as well as to help provide better satiety at meals.   - Consider pre-planning healthy meals for the week. Refer to your book for both menu and recipe ideas to get you started.  - Incorporate strategies of mindful eating every day. Practice staying in tune with your body's hunger cues and eat only when truly hungry. Avoid emotional eating/eating when not hungry.  - Consider tracking daily food intake for accountability with food choices and portions and aim for 2465-7522 calories and 100-120 gms protein daily.  - Aim for 64 ounces of water daily.  - Aim for 150 minutes of moderate-intensity physical activity per week. Resistance training is encouraged at least twice weekly.  - Follow-up as scheduled for the group classes with LA Roque.  - Follow-up with nutrition in 6 weeks.

## 2025-06-03 NOTE — PROGRESS NOTES
"Initial Nutrition Assessment    Patient was referred to nutrition by LA Roque  for weight management/desire to lose weight, as well as for education on healthy eating f. Other PMHX significant for dyslipidemia and abnormal LFTs. Pertinent labs reviewed which show A1C 5.1% and elevated LDL.     Diet recall reveals a consistent meal pattern with rare missed meals; however, reported intakes of larger portions and calorically dense foods all likely contributing to lack of desired weight loss/contributing to weight gain over time. Fluids meeting recommendations in type and amount with water as primary beverage. Pt is  incorporating some consistent physical activity at this time and would likely benefit from increased structured activity to assist in achieving goals.     See all interventions/recommendations below as discussed during visit this day.     Patient reported symptoms: Difficulty losing weight    Anthropometrics:  Height:   Ht Readings from Last 1 Encounters:   05/19/25 1.6 m (5' 3\")      Weight:   Wt Readings from Last 10 Encounters:   05/19/25 99 kg (218 lb 3.2 oz)   05/12/25 98.4 kg (217 lb)   11/19/24 100 kg (221 lb)   05/10/24 102 kg (225 lb)   03/16/24 99.8 kg (220 lb 0.3 oz)   12/28/23 102 kg (225 lb)   11/22/23 104 kg (230 lb)   10/23/23 103 kg (227 lb)   10/10/23 102 kg (225 lb 1.4 oz)   11/15/22 119 kg (262 lb)      Current BMI:   BMI Readings from Last 1 Encounters:   05/19/25 38.65 kg/m²        Labs:  Lab Results   Component Value Date    HGBA1C 5.1 11/19/2022      Lab Results   Component Value Date    CHOL 167 05/10/2025    CHOL 168 11/16/2024    CHOL 182 12/28/2023     Lab Results   Component Value Date    HDL 50 05/10/2025    HDL 47.1 11/16/2024    HDL 56.0 12/28/2023     Lab Results   Component Value Date    LDLCALC 100 (H) 05/10/2025    LDLCALC 103 (H) 11/16/2024    LDLCALC 104 (H) 12/28/2023     Lab Results   Component Value Date    TRIG 77 05/10/2025    TRIG 89 11/16/2024    " TRIG 112 12/28/2023       Malnutrition Screening:  Significant Unintentional weight loss: No  Eating less than 75% of usual intake for more than 2 weeks: No  Potential Signs of Inflammation: No    Recommended Malnutrition Diagnosis: No malnutrition identified    Diet Recall-  Breakfast- granola bar (10-15 gm protein) OR smoothie with tofu/yogurt/protein powder/ raw egg whites/  with frozed strawberries or cherries OR frozen egg sandwich from [employer] (17 gm protein).   Lunch- Grilled chicken with 2 T cottage cheese dip and banana/orange   Dinner- burger on 1/2 bun, watermelon and frozen mix   Daily Snacks- Greek yogurt or premier protein shake or protein pudding   Beverages-  Coffee 8-10 oz of coffee with 1/4 cup 2% milk, 80 oz water daily.    Alcohol- rare   Vitamins/Supplements- 2 scoops of collagen in water   Physical Activity- Yoga     Other pertinent patient reported Information:  - Past weight loss attempts include: WW and currently on Zepbound for two years, reports wt plateau over several months.   - Was working with a dietitian through the [employer] program where she received GLP-1 who recommended 160 gm protein per day as well as 2200 kcals. Reports she always tracked and did avg 120-140g protein per day (2 scoops of collagen and a protein shake)   -Aiming for 7,000 steps per day- goal to get to 10,000 steps per days. Goal to use [employer] for 20 minutes 5 days per week.       Nutrition Diagnosis: Overweight/Obesity related to food-and-nutrition-related knowledge deficit as evidenced by BMI above normative standard for age and gender.    Readiness to Learn:  Cognitive ability: Alert and oriented  Motivation to learn: Eager  Family Support: Unable to assess- family not present  Instruction provided to: Patient  Patient learns best by: Multiple methods  Factors affecting learning: None   Physical limitations affecting learning: None    Education Materials Provided:   Your Mediterranean Meal Plan Booklet  The  Wonders of Fiber     Nutrition Interventions/Recommendations for 6/3/2025:  - Please refer to your book entitled: Your Mediterranean Meal Plan, and follow Mediterranean Diet eating guidelines as reviewed.  - The Healthy Plate style of eating can be a helpful tool for incorporating healthy balanced meals in appropriate portions. (Healthy Plate: Start with a 9-inch diameter plate. Fill 1/2 the plate with non-starchy vegetables, 1/4 of the plate with whole grains or starchy vegetables, and 1/4 of the plate with a lean source of protein.   - Please aim for a source of healthy protein and fiber rich foods at meals as discussed for nutrition needs as well as to help provide better satiety at meals.   - Consider pre-planning healthy meals for the week. Refer to your book for both menu and recipe ideas to get you started.  - Incorporate strategies of mindful eating every day. Practice staying in tune with your body's hunger cues and eat only when truly hungry. Avoid emotional eating/eating when not hungry.  - Consider tracking daily food intake for accountability with food choices and portions and aim for 6938-6828 calories and 100-120 gms protein daily.  - Aim for 64 ounces of water daily.  - Aim for 150 minutes of moderate-intensity physical activity per week. Resistance training is encouraged at least twice weekly.  - Follow-up as scheduled for the group classes with LA Roque.  - Follow-up with nutrition in 6 weeks.      Nutrition Monitoring & Evaluation: adherence to recommendations and patient stated goals    Need for follow-up: Individual Nutrition Visit in 4-6 weeks and As scheduled for [facility] Ro[facility] Shared Medical Appointment ([facility])    Referred by: LA Roque     MNT Billing Type: Medical Nutrition Assessment, each 15 min increment, for 3 increments.    SIGNATURE:   Cristina Espino MS, RD, LD                                                      DATE:   6/3/2025

## 2025-06-03 NOTE — Clinical Note
Can you please place this pt on my schedule on July 14th at 11:15 am for a virtual visit FU please.  Thank you

## 2025-06-04 RX ORDER — TIRZEPATIDE 15 MG/.5ML
15 INJECTION, SOLUTION SUBCUTANEOUS
Qty: 4 EACH | Refills: 2 | Status: SHIPPED | OUTPATIENT
Start: 2025-06-04

## 2025-07-14 ENCOUNTER — APPOINTMENT (OUTPATIENT)
Dept: ENDOCRINOLOGY | Facility: CLINIC | Age: 55
End: 2025-07-14
Payer: COMMERCIAL

## 2025-07-14 ENCOUNTER — TELEMEDICINE CLINICAL SUPPORT (OUTPATIENT)
Dept: ENDOCRINOLOGY | Facility: CLINIC | Age: 55
End: 2025-07-14
Payer: COMMERCIAL

## 2025-07-14 VITALS — WEIGHT: 208 LBS | BODY MASS INDEX: 36.86 KG/M2 | HEIGHT: 63 IN

## 2025-07-14 VITALS — HEIGHT: 63 IN | WEIGHT: 208 LBS | BODY MASS INDEX: 36.86 KG/M2

## 2025-07-14 DIAGNOSIS — E66.812 CLASS 2 OBESITY DUE TO EXCESS CALORIES WITHOUT SERIOUS COMORBIDITY WITH BODY MASS INDEX (BMI) OF 38.0 TO 38.9 IN ADULT: ICD-10-CM

## 2025-07-14 DIAGNOSIS — Z71.3 DIETARY COUNSELING: ICD-10-CM

## 2025-07-14 DIAGNOSIS — R79.89 HIGH SERUM LOW-DENSITY LIPOPROTEIN (LDL): ICD-10-CM

## 2025-07-14 DIAGNOSIS — E66.09 CLASS 2 OBESITY DUE TO EXCESS CALORIES WITHOUT SERIOUS COMORBIDITY WITH BODY MASS INDEX (BMI) OF 38.0 TO 38.9 IN ADULT: ICD-10-CM

## 2025-07-14 DIAGNOSIS — E66.812 CLASS 2 OBESITY WITHOUT SERIOUS COMORBIDITY WITH BODY MASS INDEX (BMI) OF 36.0 TO 36.9 IN ADULT, UNSPECIFIED OBESITY TYPE: Primary | ICD-10-CM

## 2025-07-14 PROCEDURE — 3008F BODY MASS INDEX DOCD: CPT | Performed by: NURSE PRACTITIONER

## 2025-07-14 PROCEDURE — 99215 OFFICE O/P EST HI 40 MIN: CPT | Performed by: NURSE PRACTITIONER

## 2025-07-14 ASSESSMENT — ENCOUNTER SYMPTOMS
OCCASIONAL FEELINGS OF UNSTEADINESS: 0
LOSS OF SENSATION IN FEET: 0
DEPRESSION: 0

## 2025-07-14 NOTE — PATIENT INSTRUCTIONS
- Please refer to your book entitled: Your Mediterranean Meal Plan, and follow Mediterranean Diet eating guidelines as reviewed.  - The Healthy Plate style of eating can be a helpful tool for incorporating healthy balanced meals in appropriate portions. (Healthy Plate: Start with a 9-inch diameter plate. Fill 1/2 the plate with non-starchy vegetables, 1/4 of the plate with whole grains or starchy vegetables, and 1/4 of the plate with a lean source of protein.   - Please aim for a source of healthy protein and fiber rich foods at meals as discussed for nutrition needs as well as to help provide better satiety at meals.   - Consider pre-planning healthy meals for the week. Refer to your book for both menu and recipe ideas to get you started.  - Incorporate strategies of mindful eating every day. Practice staying in tune with your body's hunger cues and eat only when truly hungry. Avoid emotional eating/eating when not hungry.  - Consider tracking daily food intake for accountability with food choices and portions and aim for 4917-9995 calories and 100-120 gms protein daily.  - Aim for 64 ounces of water daily.  - Aim for 150 minutes of moderate-intensity physical activity per week. Resistance training is encouraged at least twice weekly.  - Follow-up as scheduled for the group classes with LA Roque.  - Follow-up with nutrition in 4 weeks.

## 2025-07-14 NOTE — PROGRESS NOTES
Follow-up Nutrition Assessment    Interval History: Patient presents for follow-up nutrition appointment for weight management/desire to lose weight. Since last nutrition visit on 6/3/2025, pt with a 4 lbs weight loss.     Diet recall reveals a consistent meal pattern with rare/some missed meals; however, reported intakes of larger portions and calorically dense foods all likely contributing to lack of desired weight loss. Fluids meeting recommendations in type and amount with water as primary beverage. Pt is incorporating some consistent physical activity at this time and would likely benefit from increased structured activity to assist in achieving goals.     Nutrition Interventions/Recommendations from last visit scheduled on 6/3/2025:  - Please refer to your book entitled: Your Mediterranean Meal Plan, and follow Mediterranean Diet eating guidelines as reviewed.  - The Healthy Plate style of eating can be a helpful tool for incorporating healthy balanced meals in appropriate portions. (Healthy Plate: Start with a 9-inch diameter plate. Fill 1/2 the plate with non-starchy vegetables, 1/4 of the plate with whole grains or starchy vegetables, and 1/4 of the plate with a lean source of protein.   - Please aim for a source of healthy protein and fiber rich foods at meals as discussed for nutrition needs as well as to help provide better satiety at meals.   - Consider pre-planning healthy meals for the week. Refer to your book for both menu and recipe ideas to get you started.  - Incorporate strategies of mindful eating every day. Practice staying in tune with your body's hunger cues and eat only when truly hungry. Avoid emotional eating/eating when not hungry.  - Consider tracking daily food intake for accountability with food choices and portions and aim for 4789-4818 calories and 100-120 gms protein daily.  - Aim for 64 ounces of water daily.  - Aim for 150 minutes of moderate-intensity physical activity per week.  "Resistance training is encouraged at least twice weekly.  - Follow-up as scheduled for the group classes with RITCHIE Roque-CNP.  - Follow-up with nutrition in 6 weeks.      Adherence to recommendations and patient stated goals: Partially met goals    Anthropometrics:  Height:   Ht Readings from Last 1 Encounters:   06/03/25 1.6 m (5' 3\")      Weight:   Wt Readings from Last 10 Encounters:   06/03/25 96.2 kg (212 lb)   05/19/25 99 kg (218 lb 3.2 oz)   05/12/25 98.4 kg (217 lb)   11/19/24 100 kg (221 lb)   05/10/24 102 kg (225 lb)   03/16/24 99.8 kg (220 lb 0.3 oz)   12/28/23 102 kg (225 lb)   11/22/23 104 kg (230 lb)   10/23/23 103 kg (227 lb)   10/10/23 102 kg (225 lb 1.4 oz)      Current BMI:   BMI Readings from Last 1 Encounters:   06/03/25 37.55 kg/m²        Malnutrition Screening:  Significant Unintentional weight loss: No  Eating less than 75% of usual intake for more than 2 weeks: No  Potential Signs of Inflammation: No     Recommended Malnutrition Diagnosis: No malnutrition identified     Diet Recall-  Breakfast- greek vanilla yogurt with granola   Lunch- skip  or sourdough and with cheese and tomato   Dinner- shrimp and tomatoes with bread and strawberries   Daily Snacks- Greek yogurt or premier protein shake or protein pudding   Beverages-  Coffee 8-10 oz of coffee with 1/4 cup 2% milk, 80 oz water daily.    Alcohol- rare   Vitamins/Supplements- 2 scoops of collagen in water   Physical Activity- hiking/ walking      Other pertinent patient reported Information:  - Currently on Zepbound for two years, reports positive appetite suppression.   -Reports being out of town for last few weeks where food was less processed and much healthier, plans to mimic this style of eating now that she is home.   -Aiming for 10,000 steps per days. Goal to continue hiking and add in some strength training,      Nutrition Diagnosis: Overweight/Obesity related to food-and-nutrition-related knowledge deficit as evidenced " by BMI above normative standard for age and gender.     Readiness to Learn:  Cognitive ability: Alert and oriented  Motivation to learn: Eager  Family Support: Unable to assess- family not present  Instruction provided to: Patient  Patient learns best by: Multiple methods  Factors affecting learning: None   Physical limitations affecting learning: None     Education Materials Provided:   none    Nutrition Interventions/Recommendations for 7/14/2025:  - Please refer to your book entitled: Your Mediterranean Meal Plan, and follow Mediterranean Diet eating guidelines as reviewed.  - The Healthy Plate style of eating can be a helpful tool for incorporating healthy balanced meals in appropriate portions. (Healthy Plate: Start with a 9-inch diameter plate. Fill 1/2 the plate with non-starchy vegetables, 1/4 of the plate with whole grains or starchy vegetables, and 1/4 of the plate with a lean source of protein.   - Please aim for a source of healthy protein and fiber rich foods at meals as discussed for nutrition needs as well as to help provide better satiety at meals.   - Consider pre-planning healthy meals for the week. Refer to your book for both menu and recipe ideas to get you started.  - Incorporate strategies of mindful eating every day. Practice staying in tune with your body's hunger cues and eat only when truly hungry. Avoid emotional eating/eating when not hungry.  - Consider tracking daily food intake for accountability with food choices and portions and aim for 7518-5949 calories and 100-120 gms protein daily.  - Aim for 64 ounces of water daily.  - Aim for 150 minutes of moderate-intensity physical activity per week. Resistance training is encouraged at least twice weekly.  - Follow-up as scheduled for the group classes with LA Roque.  - Follow-up with nutrition in 4 weeks.      Nutrition Monitoring & Evaluation: adherence to recommendations and patient stated goals    Need for follow-up:  Individual Nutrition Visit in 4-6 weeks    Referred by: Jess Cohn, APRN-CNP     MNT Billing Type: Medical Nutrition Re-Assessment, each 15 min increment, for 2 increments.    SIGNATURE:   Cristina Espino MS, RD, LD                                                      DATE:   7/14/2025

## 2025-07-14 NOTE — Clinical Note
Can you please place this pt on my schedule on 8/12/2025 at 11:15 am for a virtual visit please.  Thank you

## 2025-07-14 NOTE — PROGRESS NOTES
Subjective  Alysha Lou is a 54 y.o. female with a hx of obesity  who presents for weight management and obesity medicine follow up.    Current Plan  1. Nutrition: Mediterranean Diet, Calorie Counting, and Healthy Plate   Aiming at 1500 calori count as discussed in RD visit and states she is getting pretty close to it daily  2. Sleep: Stable      3. Stress: Stable     4. Exercise: Incorporating consistently-        5. Appetite control: Increased  Obesity medication: Mounjaro 10 mg/0.5 mL pen No side effects     6. Prior Goals: Met  -      New Goals: Nutrition- Continue Mediterranean Diet, Calorie Counting, and Healthy Plate, work to Increase fiber with high protein  with 1500 daily goal restrictions , Exercise- Strength training 3-4 times per week with hand weights , Medication- Mounjaro 10 mg/0.5 mL pen injector, I will send GLP1 cost options to you for review, and Follow-up- 1-2 months    Weight trend:    Wt Readings from Last 3 Encounters:   07/14/25 94.3 kg (208 lb)   07/14/25 94.3 kg (208 lb)   06/03/25 96.2 kg (212 lb)       Recent weight:    Current Medications[1]    ROS:  System: normal  Eyes : no visual changes  Neck : no tenderness, no new lumps/bumps  Respiratory : no SOB  Cardiovascular : no chest pain, no palpitations  Gastro-Intestinal : no abdominal concerns  Neurological : no numbness or tingling in the extremities  Musculoskeletal : no joint paint, no muscle pain  Skin : no unusual rashes  Psychiatric : no depression, no anxiety  See HPI for Endocrine ROS    Medical History[2]    Surgical History[3]    Social History     Socioeconomic History    Marital status:      Spouse name: Not on file    Number of children: Not on file    Years of education: Not on file    Highest education level: Not on file   Occupational History    Not on file   Tobacco Use    Smoking status: Never    Smokeless tobacco: Never   Vaping Use    Vaping status: Never Used   Substance and Sexual Activity     "Alcohol use: Yes    Drug use: Never    Sexual activity: Not on file   Other Topics Concern    Not on file   Social History Narrative    Not on file     Social Drivers of Health     Financial Resource Strain: Not on file   Food Insecurity: Not on file   Transportation Needs: Not on file   Physical Activity: Not on file   Stress: Not on file   Social Connections: Not on file   Intimate Partner Violence: Not on file   Housing Stability: Not on file       Objective      Physical Exam:  Height 1.6 m (5' 3\"), weight 94.3 kg (208 lb).  General : alert and oriented X3, no acute distress  Eyes : EOMI     Assessment/Plan   Alysha Lou is a 54 y.o. female with a hx of obes who presents for follow up for weight management and obesity medicine visit.    A/P Follow up:      Problem List Items Addressed This Visit       High serum low-density lipoprotein (LDL)    Class 2 obesity without serious comorbidity with body mass index (BMI) of 36.0 to 36.9 in adult - Primary     New Goals:  Nutrition- Continue Mediterranean Diet, Calorie Counting, and Healthy Plate, work to Increase fiber with high protein  with 1500 daily goal restrictions , Exercise- Strength training 3-4 times per week with hand weights , Medication- Mounjaro 10 mg/0.5 mL pen injector, I will send GLP1 cost options to you for review, and Follow-up- 1-2 months      [facility] Topic: summer eating    Dietitian Present during [facility]: Cristina Espino MS, RD, LD    Weight Management : Reviewed the principles of energy metabolism, caloric intake and expenditure, and rationale for a treatment program.  Also reinforced need for reduced calorie, low fat diet and increased physical activity.    Follow up in a group or individual visit as determined.    Jess Cohn, APRN-CNP         [1]   Current Outpatient Medications:     escitalopram (Lexapro) 10 mg tablet, Take 1 tablet (10 mg) by mouth once daily., Disp: 90 tablet, Rfl: 1    estradiol (Estrace) 1 mg tablet, Take 1 " tablet (1 mg) by mouth 1 time., Disp: , Rfl:     medroxyPROGESTERone (Provera) 2.5 mg tablet, Take 1 tablet (2.5 mg) by mouth once daily. Take with food., Disp: , Rfl:     Mounjaro 10 mg/0.5 mL pen injector, Inject 15 mg under the skin every 7 days., Disp: , Rfl:   [2]   Past Medical History:  Diagnosis Date    Abnormal finding of blood chemistry, unspecified 2012    Abnormal blood chemistry    Acute frontal sinusitis, unspecified 2013    Acute frontal sinusitis    Acute sinusitis, unspecified     Acute sinusitis    Encounter for gynecological examination (general) (routine) without abnormal findings     Encounter for gynecological examination without abnormal finding    Encounter for screening mammogram for malignant neoplasm of breast     Visit for screening mammogram    Pelvic and perineal pain 2013    Pelvic pain    Personal history of diseases of the blood and blood-forming organs and certain disorders involving the immune mechanism 2012    History of iron deficiency anemia    Personal history of other diseases of the circulatory system 2013    Personal history of cardiac murmur    Personal history of other diseases of the nervous system and sense organs     History of earache    Unspecified injury of unspecified ankle, initial encounter 2012    Ankle injury    Vitamin D deficiency, unspecified 2013    Vitamin D deficiency   [3]   Past Surgical History:  Procedure Laterality Date     SECTION, CLASSIC  2014     Section    CHOLECYSTECTOMY  2012    Cholecystectomy

## 2025-07-15 NOTE — PATIENT INSTRUCTIONS
New Goals:  Nutrition- Continue Mediterranean Diet, Calorie Counting, and Healthy Plate, work to Increase fiber with high protein  with 1500 daily goal restrictions , Exercise- Strength training 3-4 times per week with hand weights , Medication- Mounjaro 10 mg/0.5 mL pen injector, I will send GLP1 cost options to you for review, and Follow-up- 1-2 months    Options for paying out of pocket for GLP1 weight  medications (Zepbound, Wegovy) include the following:  [facility] pharmacy sells the Semaglutide, the medication in Wegovy, for 200-275 for a one month supply. This medication formula does include vitamin B in it as well (versus the brand name that does not contain vitamin B), this is due to the compound brand being required  to be 10% different from the company's patented formula per FDA regulation. [employer] is the [employer] affordability program for  Wegovy medication. Wegovy  can be purchased through the program for 399 dollars per month for one month supply of the medication. [employer] is the [employer] affordability program. The  Zepbound  can be purchased from 349 to 499 dollars per month for a one month  supply of the medicatio

## 2025-07-16 ENCOUNTER — OFFICE VISIT (OUTPATIENT)
Dept: URGENT CARE | Age: 55
End: 2025-07-16
Payer: COMMERCIAL

## 2025-07-16 VITALS
RESPIRATION RATE: 18 BRPM | WEIGHT: 208 LBS | SYSTOLIC BLOOD PRESSURE: 114 MMHG | OXYGEN SATURATION: 97 % | BODY MASS INDEX: 36.86 KG/M2 | HEART RATE: 81 BPM | DIASTOLIC BLOOD PRESSURE: 77 MMHG | HEIGHT: 63 IN | TEMPERATURE: 98 F

## 2025-07-16 DIAGNOSIS — J02.9 SORE THROAT: ICD-10-CM

## 2025-07-16 DIAGNOSIS — J01.40 ACUTE NON-RECURRENT PANSINUSITIS: Primary | ICD-10-CM

## 2025-07-16 LAB
POC HUMAN RHINOVIRUS PCR: NEGATIVE
POC INFLUENZA A VIRUS PCR: NEGATIVE
POC INFLUENZA B VIRUS PCR: NEGATIVE
POC RESPIRATORY SYNCYTIAL VIRUS PCR: NEGATIVE
POC STREPTOCOCCUS PYOGENES (GROUP A STREP) PCR: NEGATIVE

## 2025-07-16 RX ORDER — AMOXICILLIN AND CLAVULANATE POTASSIUM 875; 125 MG/1; MG/1
875 TABLET, FILM COATED ORAL 2 TIMES DAILY
Qty: 20 TABLET | Refills: 0 | Status: SHIPPED | OUTPATIENT
Start: 2025-07-16 | End: 2025-07-26

## 2025-07-16 ASSESSMENT — PAIN SCALES - GENERAL: PAINLEVEL_OUTOF10: 6

## 2025-07-16 NOTE — PROGRESS NOTES
"Subjective   Patient ID: Alysha Lou is a 54 y.o. female. They present today with a chief complaint of Earache (right sided throat and ear pain x 5days ).    Patient disposition: Home    HISTORY OF PRESENT ILLNESS:    This is an adult female with chronic right ear effusion presenting for 7 days of sinus pressure, earache, and ST on the right side. Denies f/c/s, SOB, CP, HA. ST mild.    Past Medical History  Allergies as of 07/16/2025 - Reviewed 07/16/2025   Allergen Reaction Noted    Codeine Unknown and Rash 04/01/2010       Prescriptions Prior to Admission[1]     Medical History[2]    Surgical History[3]     reports that she has never smoked. She has never used smokeless tobacco. She reports current alcohol use. She reports that she does not use drugs.    Review of Systems    Negative except as documented in the History of Present Illness.                             Objective    Vitals:    07/16/25 0951   BP: 114/77   BP Location: Right arm   Patient Position: Sitting   BP Cuff Size: Adult   Pulse: 81   Resp: 18   Temp: 36.7 °C (98 °F)   TempSrc: Oral   SpO2: 97%   Weight: 94.3 kg (208 lb)   Height: 1.6 m (5' 3\")     No LMP recorded. Patient is postmenopausal.      PHYSICAL EXAMINATION:    CONSTITUTIONAL: well-appearing, nontoxic         ENT:  Head and face are unremarkable and atraumatic. Mucous membranes moist.    * Oropharynx with yellow PND. Airway patent.    * No uvular deviation. No visible abscess.    * Lymphadenopathy absent.    * R TM with clear effusion and bulge, no erythema. Nl L TM and bl EAC.         LUNGS:  CTAB, no r/r/w.    CARDIOVASCULAR:   RRR, no m/r/g. Nl S1/S2.    ABDOMEN:  Nontender including left upper quadrant, nondistended, no acute abdomen.     MUSCULOSKELETAL: No obvious deformities. PARKER with equal strength. Gait normal.    SKIN:   Warm and dry with no rashes.    NEURO:  Normal baseline mental status.    PSYCH: Appropriate mood and affect.     "     ------------------------------------------         MDM: No ear infection but some suspicion of ABRS. Augmentin Rx today. Will continue H1 blocker and Flonase and will still see ENT about chronic R ear effusion issues. Rapid PCR negative incl for GABHS.        Procedures    Diagnostic study results (if any) were reviewed by Jarod Malloy PA-C.    Results for orders placed or performed in visit on 07/16/25   POCT SPOTFIRE R/ST Panel Mini w/Strep A ([facility]) manually resulted   Result Value Ref Range    POC Group A Strep, PCR Negative Negative    POC Respiratory Syncytial Virus PCR Negative Negative    POC Influenza A Virus PCR Negative Negative    POC Influenza B Virus PCR Negative Negative    POC Human Rhinovirus PCR Negative Negative        Assessment/Plan   Allergies, medications, history, and pertinent labs/EKGs/Imaging reviewed by Jarod Malloy PA-C.     Orders and Diagnoses  Diagnoses and all orders for this visit:  Sore throat  -     POCT SPOTFIRE R/ST Panel Mini w/Strep A ([facility]mm[facility]) manually resulted      Medical Admin Record      Follow Up Instructions  No follow-ups on file.    Electronically signed by Jarod Malloy PA-C  10:26 AM         [1] (Not in a hospital admission)  [2]   Past Medical History:  Diagnosis Date    Abnormal finding of blood chemistry, unspecified 12/07/2012    Abnormal blood chemistry    Acute frontal sinusitis, unspecified 02/11/2013    Acute frontal sinusitis    Acute sinusitis, unspecified     Acute sinusitis    Encounter for gynecological examination (general) (routine) without abnormal findings     Encounter for gynecological examination without abnormal finding    Encounter for screening mammogram for malignant neoplasm of breast     Visit for screening mammogram    Pelvic and perineal pain 11/08/2013    Pelvic pain    Personal history of diseases of the blood and blood-forming organs and certain disorders involving the immune mechanism 12/07/2012    History of iron  deficiency anemia    Personal history of other diseases of the circulatory system 2013    Personal history of cardiac murmur    Personal history of other diseases of the nervous system and sense organs     History of earache    Unspecified injury of unspecified ankle, initial encounter 2012    Ankle injury    Vitamin D deficiency, unspecified 2013    Vitamin D deficiency   [3]   Past Surgical History:  Procedure Laterality Date     SECTION, CLASSIC  2014     Section    CHOLECYSTECTOMY  2012    Cholecystectomy

## 2025-07-23 ENCOUNTER — APPOINTMENT (OUTPATIENT)
Dept: ENDOCRINOLOGY | Facility: CLINIC | Age: 55
End: 2025-07-23
Payer: COMMERCIAL

## 2025-08-06 NOTE — PROGRESS NOTES
ADULT AUDIOLOGY EVALUATION    Name:  Alysha Lou  :  1970  Age:  54 y.o.  Date of Evaluation:  2025     IMPRESSIONS     Today's test results indicate normal falling to mild essentially sensorineural hearing loss in the right ear, and normal falling to moderate sensorineural hearing loss in the left ear. Slight conductive involvement was noted at 4000 Hz in the right ear only. Tympanometry indicates excessive negative middle ear pressure in the right ear, with largely normal middle ear functioning in the left ear. Word recognition is excellent in both ears.     RECOMMENDATIONS     Medical follow up with ENT. Patient is scheduled to see Sammy Monte DO directly following today's testing.  No hearing aid recommended at this time given normal hearing at aidable frequencies.  Return for annual hearing evaluation or sooner should new concerns arise.    Time:     HISTORY     Alysha Lou is seen today for an audiologic evaluation in conjunction with otolaryngology. Patient reports concern for fluid in her right ear for the past several months. She endorsed some right aural fullness and muffled hearing on this side. She works as a teacher and noted that she may often ask for repetition or become overwhelmed with many voices talking at once. Alysha endorsed constant bilateral tinnitus described as humming that she is largely able to ignore. She noted occasional dizziness which she attributes to medication use. Patient endorsed some noise exposure through frequent concert attendance when younger. She denied otalgia and history of otologic surgeries.    TEST RESULTS     Otoscopic Evaluation:  Right Ear: Clear ear canal with unremarkable tympanic membrane  Left Ear: Clear ear canal with unremarkable tympanic membrane    Tympanometry:   Right Ear: Negative, type C tympanogram with normal ear canal volume, negative peak pressure, and normal compliance.  Left Ear: Normal, type A tympanogram  with normal ear canal volume, peak pressure and compliance. Some negative pressure noted (-140 daPa).    Ipsilateral Acoustic Reflexes:   Right Ear: Did not test due to abnormal middle ear function.  Left Ear: Absent 500-4000 Hz.    Pure Tone Audiometry (125-8000 Hz):     Right Ear: Normal hearing sensitivity from 125-4000 Hz, falling to mild sensorineural hearing loss from 7016-9321 Hz. Slight (15 dB) conductive involvement noted at 4000 Hz only.    Left Ear: Normal hearing sensitivity from 125-4000 Hz, falling to mild to borderline moderate sensorineural hearing loss from 3900-6355 Hz.     Speech Audiometry:   Right Ear:    Speech Reception Threshold (SRT) was obtained at 20 dBHL using recorded material.   Word Recognition scores were excellent (100%) in quiet when words were presented at 55 dBHL using recorded NU-6 word list ordered by difficulty.  Left Ear:    Speech Reception Threshold (SRT) was obtained at 15 dBHL using recorded material.   Word Recognition scores were excellent (90%) in quiet when words were presented at 55 dBHL using recorded NU-6 word list ordered by difficulty.       PATIENT EDUCATION     Patient was counseled in regard to findings.         Joe Mcclendon, CCC-A  Clinical Audiologist    Degree of Hearing Sensitivity Decibel Range   Within Normal Limits (WNL) 0-25   Mild 26-40   Moderate 41-55   Moderately-Severe 56-70   Severe 71-90   Profound 91+      Key   CNT/DNT Could Not Test/Did Not Test   TM Tympanic Membrane   WNL Within Normal Limits   HA Hearing Aid   SNHL Sensorineural Hearing Loss   CHL Conductive Hearing Loss   NIHL Noise-Induced Hearing Loss   ECV Ear Canal Volume   MLV Monitored Live Voice     AUDIOGRAM

## 2025-08-07 ENCOUNTER — APPOINTMENT (OUTPATIENT)
Facility: CLINIC | Age: 55
End: 2025-08-07
Payer: COMMERCIAL

## 2025-08-07 ENCOUNTER — CLINICAL SUPPORT (OUTPATIENT)
Dept: AUDIOLOGY | Facility: CLINIC | Age: 55
End: 2025-08-07
Payer: COMMERCIAL

## 2025-08-07 VITALS
SYSTOLIC BLOOD PRESSURE: 106 MMHG | BODY MASS INDEX: 36.86 KG/M2 | HEIGHT: 63 IN | DIASTOLIC BLOOD PRESSURE: 73 MMHG | WEIGHT: 208 LBS | TEMPERATURE: 97.4 F

## 2025-08-07 DIAGNOSIS — H69.91 DYSFUNCTION OF RIGHT EUSTACHIAN TUBE: ICD-10-CM

## 2025-08-07 DIAGNOSIS — H69.91 DYSFUNCTION OF RIGHT EUSTACHIAN TUBE: Primary | ICD-10-CM

## 2025-08-07 DIAGNOSIS — H90.3 SENSORINEURAL HEARING LOSS (SNHL) OF BOTH EARS: Primary | ICD-10-CM

## 2025-08-07 DIAGNOSIS — H90.3 BILATERAL SENSORINEURAL HEARING LOSS: ICD-10-CM

## 2025-08-07 DIAGNOSIS — J30.9 CHRONIC ALLERGIC RHINITIS: ICD-10-CM

## 2025-08-07 PROCEDURE — 1036F TOBACCO NON-USER: CPT | Performed by: OTOLARYNGOLOGY

## 2025-08-07 PROCEDURE — 92550 TYMPANOMETRY & REFLEX THRESH: CPT | Mod: 52

## 2025-08-07 PROCEDURE — 99204 OFFICE O/P NEW MOD 45 MIN: CPT | Performed by: OTOLARYNGOLOGY

## 2025-08-07 PROCEDURE — 3008F BODY MASS INDEX DOCD: CPT | Performed by: OTOLARYNGOLOGY

## 2025-08-07 PROCEDURE — 92557 COMPREHENSIVE HEARING TEST: CPT

## 2025-08-07 RX ORDER — LORATADINE 10 MG/1
10 TABLET ORAL DAILY
COMMUNITY

## 2025-08-07 RX ORDER — FLUTICASONE PROPIONATE 50 MCG
2 SPRAY, SUSPENSION (ML) NASAL DAILY
Qty: 48 G | Refills: 3 | Status: SHIPPED | OUTPATIENT
Start: 2025-08-07 | End: 2026-08-07

## 2025-08-07 RX ORDER — GUAIFENESIN 600 MG/1
1200 TABLET, EXTENDED RELEASE ORAL 2 TIMES DAILY
COMMUNITY

## 2025-08-07 NOTE — PROGRESS NOTES
"Impression:  1. Dysfunction of right eustachian tube        2. Bilateral sensorineural hearing loss        3. Chronic allergic rhinitis             RECOMMENDATIONS/PLAN :  I explained to the patient that her right tympanic membrane is slightly retracted however it did release with my pneumatic otoscope.  It seems like she is dealing with intermittent eustachian tube problems.  We will start her on Flonase nasal spray-2 puffs each nostril daily for the next 6 to 8 weeks.  I also want her to rinse her nose using saline rinses.      **This electronic medical record note was created with the use of voice recognition software.  Despite proofreading, typographical or grammatical errors may be present that could affect meaning of content **    Subjective   Patient ID:     Alysha Lou is a 54 y.o. female who presents to the office today stating that she was told she may have fluid in her right middle ear space.  She denies any recent sinus infections, fever or any pus draining from the sinuses.  She feels like her right ear is slightly muffled.  No imbalance or vertigo.  No recent fever or chills.    ROS:  A detailed 12 system review of systems is noted on the intake form has been reviewed with the patient with details noted in the HPI and scanned into the patient's medical record.    Objective     Medical History[1]     Surgical History[2]     RX Allergies[3]     Current Medications[4]     Tobacco Use: Low Risk  (8/7/2025)    Patient History     Smoking Tobacco Use: Never     Smokeless Tobacco Use: Never     Passive Exposure: Not on file        Alcohol Use: Not on file        Social History     Substance and Sexual Activity   Drug Use Never        Physical Exam:  Visit Vitals  /73   Temp 36.3 °C (97.4 °F) (Temporal)   Ht 1.6 m (5' 3\")   Wt 94.3 kg (208 lb)   BMI 36.85 kg/m²   OB Status Postmenopausal   Smoking Status Never   BSA 2.05 m²      General: Patient is alert, oriented, cooperative in no apparent " distress.  Head: Normocephalic, atraumatic.  Eyes: PERRL, EOMI, Conjunctiva is clear. No nystagmus.  Ears: Right Ear-- Pinna is normal.  External auditory canal is patent. Tympanic membrane is intact and somewhat retracted however it did release with my pneumatic otoscope.  No effusion..  Mastoid is nontender.  Left ear-- Pinna is normal.  External auditory canal is patent. Tympanic membrane is [intact, translucent and has good mobility with my pneumatic otoscope.  No effusion].  Mastoid is nontender.  Nose: Septum is relatively straight.  No septal perforation or lesions. No septal hematoma/ seroma.  No signs of bleeding.  Inferior turbinates are mildly swollen.   No evidence of intranasal polyps.  No infectious drainage.  Throat:  Floor of mouth is clear, no masses.  Tongue appears normal, no lesions or masses. Gums, gingiva, buccal mucosa appear pink and moist, no lesions. Teeth are in good repair.  No obvious dental infections.  Peritonsillar regions appear symmetric without swelling.  Hard and soft palate appear normal, no obvious cleft. Uvula is midline.  Oropharynx: No lesions. Retropharyngeal wall is flat.  No active postnasal drip.  Neck: Supple,  no lymphadenopathy.  No masses.  Salivary Glands: Symmetric bilaterally.  No palpable masses.  No evidence of acute infection or salivary stones  Neurologic: Cranial Nerves 2-12 are grossly intact without focal deficits. Cerebellar function testing is normal.     Results:   I reviewed her recent audiogram and she does have a mild high-frequency sensorineural loss that is symmetric in both ears.  Right TM is slightly retracted and the left 1 has normal mobility on tympanometry.  Word recognition scores 100% in the right ear and 90% in the left ear.  Speech reception threshold is 20 dB in the right ear and 15 dB on the left ear.    Procedure:   []    Sammy Monte DO        [1]   Past Medical History:  Diagnosis Date    Abnormal finding of blood chemistry,  unspecified 2012    Abnormal blood chemistry    Acute frontal sinusitis, unspecified 2013    Acute frontal sinusitis    Acute sinusitis, unspecified     Acute sinusitis    Encounter for gynecological examination (general) (routine) without abnormal findings     Encounter for gynecological examination without abnormal finding    Encounter for screening mammogram for malignant neoplasm of breast     Visit for screening mammogram    Pelvic and perineal pain 2013    Pelvic pain    Personal history of diseases of the blood and blood-forming organs and certain disorders involving the immune mechanism 2012    History of iron deficiency anemia    Personal history of other diseases of the circulatory system 2013    Personal history of cardiac murmur    Personal history of other diseases of the nervous system and sense organs     History of earache    Unspecified injury of unspecified ankle, initial encounter 2012    Ankle injury    Vitamin D deficiency, unspecified 2013    Vitamin D deficiency   [2]   Past Surgical History:  Procedure Laterality Date     SECTION, CLASSIC  2014     Section    CHOLECYSTECTOMY  2012    Cholecystectomy   [3]   Allergies  Allergen Reactions    Codeine Unknown and Rash   [4]   Current Outpatient Medications:     estradiol (Estrace) 1 mg tablet, Take 1 tablet (1 mg) by mouth 1 time., Disp: , Rfl:     guaiFENesin (Mucinex) 600 mg 12 hr tablet, Take 2 tablets (1,200 mg) by mouth 2 times a day. Do not crush, chew, or split., Disp: , Rfl:     loratadine (Claritin) 10 mg tablet, Take 1 tablet (10 mg) by mouth once daily., Disp: , Rfl:     medroxyPROGESTERone (Provera) 2.5 mg tablet, Take 1 tablet (2.5 mg) by mouth once daily. Take with food., Disp: , Rfl:     Mounjaro 10 mg/0.5 mL pen injector, Inject 15 mg under the skin every 7 days., Disp: , Rfl:     escitalopram (Lexapro) 10 mg tablet, Take 1 tablet (10 mg) by mouth once daily.  (Patient not taking: Reported on 8/7/2025), Disp: 90 tablet, Rfl: 1    semaglutide, Inject 2.268mg under the skin once a week, Disp: 2 mL, Rfl: 4

## 2025-08-12 ENCOUNTER — APPOINTMENT (OUTPATIENT)
Dept: ENDOCRINOLOGY | Facility: CLINIC | Age: 55
End: 2025-08-12
Payer: COMMERCIAL

## 2025-08-29 ENCOUNTER — PATIENT MESSAGE (OUTPATIENT)
Dept: ENDOCRINOLOGY | Facility: CLINIC | Age: 55
End: 2025-08-29
Payer: COMMERCIAL

## 2025-11-21 ENCOUNTER — APPOINTMENT (OUTPATIENT)
Dept: PRIMARY CARE | Facility: CLINIC | Age: 55
End: 2025-11-21
Payer: COMMERCIAL

## 2026-05-15 ENCOUNTER — APPOINTMENT (OUTPATIENT)
Dept: PRIMARY CARE | Facility: CLINIC | Age: 56
End: 2026-05-15
Payer: COMMERCIAL